# Patient Record
Sex: FEMALE | Race: WHITE
[De-identification: names, ages, dates, MRNs, and addresses within clinical notes are randomized per-mention and may not be internally consistent; named-entity substitution may affect disease eponyms.]

---

## 2017-11-14 ENCOUNTER — HOSPITAL ENCOUNTER (INPATIENT)
Dept: HOSPITAL 62 - ER | Age: 72
LOS: 1 days | Discharge: ANOTHER HOSPITAL | DRG: 189 | End: 2017-11-15
Attending: FAMILY MEDICINE | Admitting: FAMILY MEDICINE
Payer: MEDICARE

## 2017-11-14 DIAGNOSIS — J90: ICD-10-CM

## 2017-11-14 DIAGNOSIS — G50.0: ICD-10-CM

## 2017-11-14 DIAGNOSIS — J86.9: ICD-10-CM

## 2017-11-14 DIAGNOSIS — Z79.899: ICD-10-CM

## 2017-11-14 DIAGNOSIS — E11.9: ICD-10-CM

## 2017-11-14 DIAGNOSIS — Z88.8: ICD-10-CM

## 2017-11-14 DIAGNOSIS — Z87.891: ICD-10-CM

## 2017-11-14 DIAGNOSIS — E44.0: ICD-10-CM

## 2017-11-14 DIAGNOSIS — H40.9: ICD-10-CM

## 2017-11-14 DIAGNOSIS — Z79.84: ICD-10-CM

## 2017-11-14 DIAGNOSIS — J18.1: ICD-10-CM

## 2017-11-14 DIAGNOSIS — J44.1: ICD-10-CM

## 2017-11-14 DIAGNOSIS — D64.9: ICD-10-CM

## 2017-11-14 DIAGNOSIS — F11.20: ICD-10-CM

## 2017-11-14 DIAGNOSIS — J44.0: ICD-10-CM

## 2017-11-14 DIAGNOSIS — J96.01: Primary | ICD-10-CM

## 2017-11-14 LAB
ALBUMIN SERPL-MCNC: 2.9 G/DL (ref 3.5–5)
ALP SERPL-CCNC: 99 U/L (ref 38–126)
ALT SERPL-CCNC: 21 U/L (ref 9–52)
ANION GAP SERPL CALC-SCNC: 11 MMOL/L (ref 5–19)
AST SERPL-CCNC: 30 U/L (ref 14–36)
BASE EXCESS BLDV CALC-SCNC: 5.4 MMOL/L
BASOPHILS # BLD AUTO: 0 10^3/UL (ref 0–0.2)
BASOPHILS NFR BLD AUTO: 0.3 % (ref 0–2)
BILIRUB DIRECT SERPL-MCNC: 0.5 MG/DL (ref 0–0.4)
BILIRUB SERPL-MCNC: 0.6 MG/DL (ref 0.2–1.3)
BUN SERPL-MCNC: 17 MG/DL (ref 7–20)
CALCIUM: 8.8 MG/DL (ref 8.4–10.2)
CHLORIDE SERPL-SCNC: 96 MMOL/L (ref 98–107)
CK MB SERPL-MCNC: < 0.22 NG/ML (ref ?–4.55)
CK SERPL-CCNC: < 20 U/L (ref 30–135)
CO2 SERPL-SCNC: 31 MMOL/L (ref 22–30)
CREAT SERPL-MCNC: 0.48 MG/DL (ref 0.52–1.25)
EOSINOPHIL # BLD AUTO: 0 10^3/UL (ref 0–0.6)
EOSINOPHIL NFR BLD AUTO: 0 % (ref 0–6)
ERYTHROCYTE [DISTWIDTH] IN BLOOD BY AUTOMATED COUNT: 17.4 % (ref 11.5–14)
GLUCOSE SERPL-MCNC: 183 MG/DL (ref 75–110)
HCO3 BLDV-SCNC: 29.5 MMOL/L (ref 20–32)
HCT VFR BLD CALC: 30.2 % (ref 36–47)
HGB BLD-MCNC: 9.9 G/DL (ref 12–15.5)
HGB HCT DIFFERENCE: -0.5
LYMPHOCYTES # BLD AUTO: 0.8 10^3/UL (ref 0.5–4.7)
LYMPHOCYTES NFR BLD AUTO: 6.8 % (ref 13–45)
MCH RBC QN AUTO: 28.4 PG (ref 27–33.4)
MCHC RBC AUTO-ENTMCNC: 32.6 G/DL (ref 32–36)
MCV RBC AUTO: 87 FL (ref 80–97)
MONOCYTES # BLD AUTO: 1.2 10^3/UL (ref 0.1–1.4)
MONOCYTES NFR BLD AUTO: 9.4 % (ref 3–13)
NEUTROPHILS # BLD AUTO: 10.4 10^3/UL (ref 1.7–8.2)
NEUTS SEG NFR BLD AUTO: 83.5 % (ref 42–78)
PCO2 BLDV: 41.6 MMHG (ref 35–63)
PH BLDV: 7.47 [PH] (ref 7.3–7.42)
POTASSIUM SERPL-SCNC: 4.3 MMOL/L (ref 3.6–5)
PROT SERPL-MCNC: 6.7 G/DL (ref 6.3–8.2)
RBC # BLD AUTO: 3.48 10^6/UL (ref 3.72–5.28)
SODIUM SERPL-SCNC: 138 MMOL/L (ref 137–145)
TROPONIN I SERPL-MCNC: < 0.012 NG/ML
TROPONIN I SERPL-MCNC: < 0.012 NG/ML
WBC # BLD AUTO: 12.5 10^3/UL (ref 4–10.5)

## 2017-11-14 PROCEDURE — 94640 AIRWAY INHALATION TREATMENT: CPT

## 2017-11-14 PROCEDURE — 94799 UNLISTED PULMONARY SVC/PX: CPT

## 2017-11-14 PROCEDURE — 71010: CPT

## 2017-11-14 PROCEDURE — 80053 COMPREHEN METABOLIC PANEL: CPT

## 2017-11-14 PROCEDURE — 80048 BASIC METABOLIC PNL TOTAL CA: CPT

## 2017-11-14 PROCEDURE — 96375 TX/PRO/DX INJ NEW DRUG ADDON: CPT

## 2017-11-14 PROCEDURE — 82553 CREATINE MB FRACTION: CPT

## 2017-11-14 PROCEDURE — 82550 ASSAY OF CK (CPK): CPT

## 2017-11-14 PROCEDURE — 85610 PROTHROMBIN TIME: CPT

## 2017-11-14 PROCEDURE — 82728 ASSAY OF FERRITIN: CPT

## 2017-11-14 PROCEDURE — 83880 ASSAY OF NATRIURETIC PEPTIDE: CPT

## 2017-11-14 PROCEDURE — 96367 TX/PROPH/DG ADDL SEQ IV INF: CPT

## 2017-11-14 PROCEDURE — 71275 CT ANGIOGRAPHY CHEST: CPT

## 2017-11-14 PROCEDURE — 82607 VITAMIN B-12: CPT

## 2017-11-14 PROCEDURE — 93005 ELECTROCARDIOGRAM TRACING: CPT

## 2017-11-14 PROCEDURE — 36415 COLL VENOUS BLD VENIPUNCTURE: CPT

## 2017-11-14 PROCEDURE — 84466 ASSAY OF TRANSFERRIN: CPT

## 2017-11-14 PROCEDURE — 84484 ASSAY OF TROPONIN QUANT: CPT

## 2017-11-14 PROCEDURE — 82746 ASSAY OF FOLIC ACID SERUM: CPT

## 2017-11-14 PROCEDURE — 87040 BLOOD CULTURE FOR BACTERIA: CPT

## 2017-11-14 PROCEDURE — 83550 IRON BINDING TEST: CPT

## 2017-11-14 PROCEDURE — 99291 CRITICAL CARE FIRST HOUR: CPT

## 2017-11-14 PROCEDURE — 85730 THROMBOPLASTIN TIME PARTIAL: CPT

## 2017-11-14 PROCEDURE — 82962 GLUCOSE BLOOD TEST: CPT

## 2017-11-14 PROCEDURE — 85025 COMPLETE CBC W/AUTO DIFF WBC: CPT

## 2017-11-14 PROCEDURE — 84443 ASSAY THYROID STIM HORMONE: CPT

## 2017-11-14 PROCEDURE — 82803 BLOOD GASES ANY COMBINATION: CPT

## 2017-11-14 PROCEDURE — 93010 ELECTROCARDIOGRAM REPORT: CPT

## 2017-11-14 PROCEDURE — 83605 ASSAY OF LACTIC ACID: CPT

## 2017-11-14 PROCEDURE — 86480 TB TEST CELL IMMUN MEASURE: CPT

## 2017-11-14 PROCEDURE — 83540 ASSAY OF IRON: CPT

## 2017-11-14 PROCEDURE — 85045 AUTOMATED RETICULOCYTE COUNT: CPT

## 2017-11-14 PROCEDURE — 96361 HYDRATE IV INFUSION ADD-ON: CPT

## 2017-11-14 PROCEDURE — 83036 HEMOGLOBIN GLYCOSYLATED A1C: CPT

## 2017-11-14 PROCEDURE — 96365 THER/PROPH/DIAG IV INF INIT: CPT

## 2017-11-14 RX ADMIN — CEFEPIME HYDROCHLORIDE SCH ML: 2 INJECTION, SOLUTION INTRAVENOUS at 23:30

## 2017-11-14 RX ADMIN — TIMOLOL MALEATE SCH DROP: 5 SOLUTION/ DROPS OPHTHALMIC at 23:51

## 2017-11-14 RX ADMIN — METHYLPREDNISOLONE SODIUM SUCCINATE SCH MG: 40 INJECTION, POWDER, FOR SOLUTION INTRAMUSCULAR; INTRAVENOUS at 23:48

## 2017-11-14 RX ADMIN — ATORVASTATIN CALCIUM SCH MG: 10 TABLET, FILM COATED ORAL at 23:49

## 2017-11-14 RX ADMIN — SENNOSIDES, DOCUSATE SODIUM SCH EACH: 50; 8.6 TABLET, FILM COATED ORAL at 23:49

## 2017-11-14 RX ADMIN — GUAIFENESIN SCH MG: 600 TABLET, EXTENDED RELEASE ORAL at 23:50

## 2017-11-14 RX ADMIN — Medication SCH ML: at 23:51

## 2017-11-14 NOTE — PDOC H&P
History of Present Illness


Admission Date/PCP: 


  17 20:56





  FAUSTINA RAMSEY MD





History of Present Illness: 


KSENIA ESTRADA is a 72 year old female with past medical history of trigeminal 

neuralgia chronic, chronic opiate dependence, diabetes mellitus type 2, COPD, 

and glaucoma who presents to the emergency department with a cough of several 

weeks duration.  Patient reports that she was diagnosed with pneumonia 

approximately 1 month ago and received an antibiotic which she cannot recall 

the name of and completed.  She did request states that she did not feel any 

better after having this antibiotic.  She reports that her shortness of breath 

has gotten worse.  She does admit to chest pain on both her left and right 

side.  She reports that this is a sharp stabbing pain that is worse with deep 

inspiration.  She also reports orthopnea, dyspnea on exertion, and bilateral 

lower extremity edema.  She reports that she is currently being worked up for 

congestive heart failure and has been placed on a water pill.  She takes states 

that she takes this every other day.  She reports that she has not eaten for 

the last 2 days.  She does report she has had some loose stool over the last 

several days.  Patient is referred to the hospitalist service for sepsis, 

pneumonia.








Please note that patient's medications are listed but do not include her 

oxycodone 15 mg p.o. every 6H prn





Past Medical History


Pulmonary Medical History: Reports: Chronic Obstructive Pulmonary Disease (COPD)

, Pneumonia


EENT Medical History: Reports: Other - Glaucoma


Neurological Medical History: Reports: Other - Trigeminal neuralgia


Endocrine Medical History: Reports: Diabetes Mellitus Type 2


Psychiatric Medical History: 


   Denies: Depression





Past Surgical History


Past Surgical History: Reports:  Section, Other - Trigeminal neuralgia 

surgery





Social History


Smoking Status: Former Smoker


Cigarettes Packs Per Day: 0.5


Number of Years Smokin


Last Time Smoked: 2017


Hx Recreational Drug Use: No


Hx Prescription Drug Abuse: No





- Advance Directive


Resuscitation Status: Full Code


Surrogate healthcare decision maker:: 





Mary Jo Dimasrisa, daughter





Family History


Family History: COPD, CVA


Parental Family History Reviewed: Yes


Children Family History Reviewed: Yes


Sibling(s) Family History Reviewed.: Yes





Medication/Allergy


Home Medications: 








Atorvastatin Calcium [Lipitor 10 mg Tablet] 10 mg PO QHS 17 


Citalopram Hydrobromide [Citalopram HBr] 15 mg PO DAILY 17 


Metformin HCl [Metformin HCl ER] 500 mg PO DAILY 17 


Timolol Maleate [Timoptic 0.5% Oph Soln 5 ml] 1 drop BTH_EYE Q12 17 








Allergies/Adverse Reactions: 


 





carbamazepine [From Tegretol] Allergy (Verified 17 15:56)


 


morphine [Morphine] Allergy (Verified 17 15:56)


 











Review of Systems


Constitutional: PRESENT: weakness.  ABSENT: chills, fever(s), headache(s), 

weight gain, weight loss


Eyes: ABSENT: visual disturbances


Ears: ABSENT: hearing changes


Nose, Mouth, and Throat: PRESENT: headache(s) - Chronic due to trigeminal 

neuralgia


Cardiovascular: PRESENT: chest pain, dyspnea on exertion, edema, orthropnea.  

ABSENT: palpitations


Respiratory: PRESENT: cough, dyspnea, sputum.  ABSENT: hemoptysis


Gastrointestinal: PRESENT: constipation, diarrhea, nausea.  ABSENT: abdominal 

pain, hematemesis, hematochezia, melena, vomiting


Genitourinary: ABSENT: difficulty urinating, dysuria, hematuria


Musculoskeletal: ABSENT: joint swelling


Integumentary: ABSENT: rash, wounds


Neurological: ABSENT: abnormal gait, abnormal speech, confusion, dizziness, 

focal weakness, syncope


Psychiatric: ABSENT: anxiety, depression, homidical ideation, suicidal ideation


Endocrine: ABSENT: cold intolerance, heat intolerance, polydipsia, polyuria


Hematologic/Lymphatic: ABSENT: easy bleeding, easy bruising





Physical Exam


Vital Signs: 


 











Temp Pulse Resp BP Pulse Ox


 


 97.6 F   60   20   92/49 L  94 


 


 17 22:33  17 22:33  17 22:33  17 22:33  17 22:33








 Intake & Output











 11/13/17 11/14/17 11/15/17





 06:59 06:59 06:59


 


Weight   47 kg











General appearance: PRESENT: mild distress - Acutely ill-appearing, well-

developed, well-nourished


Head exam: PRESENT: atraumatic, normocephalic


Eye exam: PRESENT: conjunctiva pink, EOMI, PERRLA.  ABSENT: conjunctival 

injection, scleral icterus


Ear exam: PRESENT: normal external ear exam


Mouth exam: PRESENT: dry mucosa, tongue midline


Neck exam: ABSENT: JVD, lymphadenopathy, thyromegaly, tracheal deviation


Respiratory exam: PRESENT: crackles, decreased breath sounds - Right lower lobe

, egophony, prolonged expiratory phas, rhonchi - Bilateral, symmetrical, 

tachypnea, other - General poor air excursion.  ABSENT: accessory muscle use, 

rales, unlabored, wheezes


Cardiovascular exam: PRESENT: RRR, +S1, +S2.  ABSENT: diastolic murmur, rubs, 

systolic murmur


Pulses: PRESENT: normal dorsalis pedis pul


Vascular exam: PRESENT: normal capillary refill


GI/Abdominal exam: PRESENT: hypoactive bowel sounds, normal bowel sounds, soft.

  ABSENT: distended, guarding, mass, Araujo's sign, organolmegaly, rebound, 

rigid, tenderness


Rectal exam: PRESENT: deferred


Extremities exam: PRESENT: clubbing, full ROM, +2 edema - To mid shin.  ABSENT: 

calf tenderness


Neurological exam: PRESENT: alert, awake, oriented to person, oriented to place

, oriented to time, oriented to situation, CN II-XII grossly intact.  ABSENT: 

motor sensory deficit


Psychiatric exam: PRESENT: anxious, appropriate affect.  ABSENT: homicidal 

ideation, suicidal ideation


Skin exam: PRESENT: dry, intact, warm.  ABSENT: cyanosis, rash





Results


Laboratory Results: 


 











  17





  21:45


 


TSH  0.81








 











  17





  21:45 21:45


 


Creatine Kinase  < 20 L 


 


CK-MB (CK-2)   < 0.22


 


Troponin I   < 0.012








 











  17





  17:13 17:13 17:13


 


WBC  12.5 H  


 


Hgb  9.9 L  


 


Plt Count  614 H  


 


VBG pH   


 


Sodium   138.0 


 


Potassium   4.3 


 


Chloride   96 L 


 


Carbon Dioxide   31 H 


 


Anion Gap   11 


 


BUN   17 


 


Creatinine   0.48 L 


 


Glucose   183 H 


 


Lactic Acid   


 


AST   30 


 


ALT   21 


 


Alkaline Phosphatase   99 


 


Creatine Kinase   < 20 L 


 


Troponin I    < 0.012


 


NT-Pro-B Natriuret Pep    2930 H


 


Total Protein   6.7 


 


Albumin   2.9 L 














  17





  17:13 17:13


 


WBC  


 


Hgb  


 


Plt Count  


 


VBG pH   7.47 H


 


Sodium  


 


Potassium  


 


Chloride  


 


Carbon Dioxide  


 


Anion Gap  


 


BUN  


 


Creatinine  


 


Glucose  


 


Lactic Acid  1.2 


 


AST  


 


ALT  


 


Alkaline Phosphatase  


 


Creatine Kinase  


 


Troponin I  


 


NT-Pro-B Natriuret Pep  


 


Total Protein  


 


Albumin  








Impressions: 


 





Chest/Abdomen CTA  17 00:00


IMPRESSION:  Extensive pneumonia on the right with large right pleural 

effusion.  Possible cavitary component.


Peripheral opacity in the left lung.


No pulmonary emboli.


 








Chest X-Ray  17 16:50


IMPRESSION:  Right pleural effusion.  Right lower lobe pneumonia versus 

atelectasis.


 











Status: Imported from PACS





Assessment & Plan





- Diagnosis


(1) Pneumonia


Qualifiers: 


   Pneumonia type: due to unspecified organism   Laterality: right   Lung 

location: lower lobe of lung   Qualified Code(s): J18.1 - Lobar pneumonia, 

unspecified organism   


Is this a current diagnosis for this admission?: Yes   


Plan: 


Patient reports that she failed outpatient therapy having an unknown antibiotic 

approximately 1 month ago.  In light of patient's COPD and chronic opiate 

dependence, have concerned for possible aspiration as there is a possible 

cavitary lesion on her CT.  Patient has not traveled to endemic parts of the of 

the world for tuberculosis or has been incarcerated, but will obtain a 

QuantiFERON gold.  





Place patient on cefepime, gentamicin, and Levaquin.  Will also place patient 

on Solu-Medrol.  Consider vancomycin if MRSA becomes suspect.








(2) Acute hypoxemic respiratory failure


Is this a current diagnosis for this admission?: Yes   


Plan: 


Oxygen therapy to maintain saturation between 90 and 94








(3) Pleural effusion, right


Is this a current diagnosis for this admission?: Yes   


Plan: 


Have concerned that this is a multifactorial effusion given patient's 

presentation sounds quite a bit like congestive heart failure.  Obtain an echo, 

and a thoracocentesis.  There is concern for a malignant effusion as well as a 

empyema.  This will be very revealing for this.  Hold on any Lasix at this time 

due to patient's relative hypotension.








(4) COPD exacerbation


Is this a current diagnosis for this admission?: Yes   


Plan: 


Place patient on scheduled nebulized treatments as well as Solu-Medrol








(5) Trigeminal neuralgia


Is this a current diagnosis for this admission?: Yes   


Plan: 


Continue oxycodone if blood pressure and breathing permits.  Patient reports 

her allergy to Tegretol is a rash and to oral morphine is a stomach problem 

undefined.








(6) Chronic prescription opiate use


Is this a current diagnosis for this admission?: Yes   


Plan: 


Continue oxycodone








(7) Malnutrition


Qualifiers: 


   Malnutrition type: protein-calorie malnutrition   Protein-calorie 

malnutrition severity: moderate   Qualified Code(s): E44.0 - Moderate protein-

calorie malnutrition   


Is this a current diagnosis for this admission?: Yes   


Plan: 


Place patient on Glucerna 3 times daily.








(8) Failure of outpatient therapy


Is this a current diagnosis for this admission?: Yes   





(9) DM type 2 (diabetes mellitus, type 2)


Qualifiers: 


   Diabetes mellitus complication status: with unspecified complications   

Diabetes mellitus long term insulin use: without long term use   Qualified Code(

s): E11.8 - Type 2 diabetes mellitus with unspecified complications   


Is this a current diagnosis for this admission?: Yes   


Plan: 


Will hold patient's metformin and place on sliding scale insulin.  Check A1c.








(10) Anemia


Qualifiers: 


   Anemia type: unspecified type   Qualified Code(s): D64.9 - Anemia, 

unspecified   


Is this a current diagnosis for this admission?: Yes   


Plan: 


We will guaiac patient's stool.  Obtain anemia studies.








- Time


Time Spent: 50 to 70 Minutes


Medications reviewed and adjusted accordingly: Yes


Anticipated discharge: Home





- Inpatient Certification


Based on my medical assessment, after consideration of the patient's 

comorbidities, presenting symptoms, or acuity I expect that the services needed 

warrant INPATIENT care.: Yes


I certify that my determination is in accordance with my understanding of 

Medicare's requirements for reasonable and necessary INPATIENT services [42 CFR 

412.3e].: Yes


Medical Necessity: Failure to Improve With Outpatient Therapy, Need For IV 

Fluids, Need For Continuous Telemetry Monitoring, Need for Nebulizer Therapy 

and Monitoring of Response, Need for IV Antibiotics


Post Hospital Care: D/C Planner Documentation

## 2017-11-14 NOTE — ER DOCUMENT REPORT
ED Respiratory Problem





- General


Chief Complaint: Cough


Stated Complaint: NO APPETITE


Time Seen by Provider: 17 17:39


Notes: 


She is a 72-year-old female who presents with increasing productive cough and 

decreased appetite over the past few days.  She is also having mild shortness 

of breath. She does not wear oxygen at home. She denies chest pain, recent 

travel, fevers, nausea, vomiting, back pain, leg swelling, hemoptysis or sick 

contacts.


TRAVEL OUTSIDE OF THE [place] IN LAST 30 DAYS: No





- Related Data


Allergies/Adverse Reactions: 


 





carbamazepine [From Tegretol] Allergy (Verified 17 15:56)


 


morphine [Morphine] Allergy (Verified 17 15:56)


 








Home Medications: 


 Current Home Medications





Atorvastatin Calcium [Lipitor 10 mg Tablet] 10 mg PO QHS 17 [History]


Citalopram Hydrobromide [Citalopram HBr] 15 mg PO DAILY 17 [History]


Metformin HCl [Metformin HCl ER] 500 mg PO DAILY 17 [History]


Timolol Maleate [Timoptic 0.5% Oph Soln 5 ml] 1 drop BTH_EYE Q12 17 [

History]











Past Medical History





- General


Information source: Patient





- Social History


Smoking Status: Former Smoker


Chew tobacco use (# tins/day): No


Frequency of alcohol use: None


Drug Abuse: None


Family History: Reviewed & Not Pertinent


Patient has suicidal ideation: No


Patient has homicidal ideation: No


Endocrine Medical History: Reports: Hx Diabetes Mellitus Type 2


Renal/ Medical History: Denies: Hx Peritoneal Dialysis


Past Surgical History: Reports: Hx  Section, Hx Neurologic Surgery - 

facial nerve





- Immunizations


Hx Diphtheria, Pertussis, Tetanus Vaccination: Yes





Review of Systems





- Review of Systems


Notes: 


REVIEW OF SYSTEMS:


CONSTITUTIONAL: -fevers, -chills


EENT: -eye pain, -difficulty swallowing, -nasal congestion


CARDIOVASCULAR:-chest pain, -syncope.


RESPIRATORY: +cough, +SOB


GASTROINTESTINAL: -abdominal pain, -nausea, -vomiting, -diarrhea


GENITOURINARY: -dysuria, -hematuria


MUSCULOSKELETAL: -back pain, -neck pain


SKIN: -rash or skin lesions.


HEMATOLOGIC: -easy bruising or bleeding.


LYMPHATIC: -swollen, enlarged glands.


NEUROLOGICAL: -altered mental status or loss of consciousness, -headache, -

neurologic symptoms


PSYCHIATRIC: -anxiety, -depression.


ALL OTHER SYSTEMS REVIEWED AND NEGATIVE.





Physical Exam





- Vital signs


Vitals: 


 











Temp Pulse Resp BP Pulse Ox


 


 98 F   70   28 H  104/52 L  91 L


 


 17 15:59  17 15:59  17 15:59  17 15:59  17 15:59














- Notes


Notes: 


PHYSICAL EXAMINATION:





GENERAL: Mild respiratory distress.





HEAD: Atraumatic, normocephalic.





EYES: Pupils equal round and reactive to light, extraocular movements intact, 

sclera anicteric, conjunctiva are normal.





ENT: nares patent, oropharynx clear without exudates.  Moist mucous membranes.





NECK: Normal range of motion, supple without lymphadenopathy





LUNGS: Tachypnea. Crackles in RLL.





HEART: Regular rate and rhythm without murmurs





ABDOMEN: Soft, nontender, normoactive bowel sounds.  No guarding, no rebound.  

No masses appreciated.





EXTREMITIES: Normal range of motion, no pitting or edema.  No cyanosis.





NEUROLOGICAL: Cranial nerves grossly intact.  Normal speech, normal gait.  

Normal sensory and motor exams.





PSYCH: Normal mood, normal affect.





SKIN: Warm, Dry, normal turgor, no rashes or lesions noted.





Course





- Re-evaluation


Re-evalutation: 


Patient with right lower lobe pleural effusion and pneumonia.  With the 

leukocytosis and productive cough, will begin antibiotics for CAP.  She remains 

tachypneic at rest, which worsens during exertion.  Rest of blood work is all 

unremarkable.  She recently moved to [place] from [place] to live 

with her daughter and her primary care physician is Jasen Sullivan.





17 21:00 Spoke to Dr. Kathleen (Hospitalist) and she will admit patient as 

Inpatient to [facility] for further treatment and monitoring of her extensive 

pneumonia. CTA shows a possible cavitary lesion, but patient has no risk 

factors for tuberculosis and no recent travel or long term time.





- Vital Signs


Vital signs: 


 











Temp Pulse Resp BP Pulse Ox


 


 97.8 F   64   36 H  129/55 H  93 


 


 17 20:07  17 20:00  17 21:13  17 21:13  17 21:13














- Laboratory


Result Diagrams: 


 17 17:13





 17 17:13


Laboratory results interpreted by me: 


 











  17





  17:13 17:13 17:13


 


WBC  12.5 H  


 


RBC  3.48 L  


 


Hgb  9.9 L  


 


Hct  30.2 L  


 


RDW  17.4 H  


 


Plt Count  614 H  


 


Seg Neutrophils %  83.5 H  


 


Lymphocytes %  6.8 L  


 


Absolute Neutrophils  10.4 H  


 


VBG pH   


 


Chloride   96 L 


 


Carbon Dioxide   31 H 


 


Creatinine   0.48 L 


 


Glucose   183 H 


 


Direct Bilirubin   0.5 H 


 


Creatine Kinase   < 20 L 


 


NT-Pro-B Natriuret Pep    2930 H


 


Albumin   2.9 L 














  17





  17:13


 


WBC 


 


RBC 


 


Hgb 


 


Hct 


 


RDW 


 


Plt Count 


 


Seg Neutrophils % 


 


Lymphocytes % 


 


Absolute Neutrophils 


 


VBG pH  7.47 H


 


Chloride 


 


Carbon Dioxide 


 


Creatinine 


 


Glucose 


 


Direct Bilirubin 


 


Creatine Kinase 


 


NT-Pro-B Natriuret Pep 


 


Albumin 














- Diagnostic Test


Radiology reviewed: Image reviewed, Reports reviewed


Radiology results interpreted by me: 


CXR: Right pleural effusion. Right lower lobe pneumonia versus atelectasis.


CTA Chest: Extensive pneumonia on the right with large right pleural effusion. 

Possible cavitary component. Peripheral opacity in the left lung. No pulmonary 

emboli.





- EKG Interpretation by Me


EKG shows normal: Sinus rhythm, Axis, Intervals, QRS Complexes, ST-T Waves


Rate: Normal


When compared to previous EKG there are: No significant change





Critical Care Note





- Critical Care Note


Total time excluding time spent on procedures (mins): 35





Discharge





- Discharge


Clinical Impression: 


 Pleural effusion, right





Pneumonia


Qualifiers:


 Pneumonia type: due to unspecified organism Laterality: right Lung location: 

lower lobe of lung Qualified Code(s): J18.1 - Lobar pneumonia, unspecified 

organism





Condition: Stable


Disposition: ADMITTED AS INPATIENT


Admitting Provider: Hospitalist [facility]


Unit Admitted: [facility]

## 2017-11-14 NOTE — RADIOLOGY REPORT (SQ)
EXAM DESCRIPTION:  CTA CHEST



COMPLETED DATE/TIME:  11/14/2017 9:04 pm



REASON FOR STUDY:  SOB



COMPARISON:  None.



TECHNIQUE:  CT scan of the chest performed using helical scanning technique with dynamic intravenous 
contrast injection.  Images reviewed with lung, soft tissue and bone windows.  Reconstructed coronal 
and sagittal MPR images reviewed.

Additional 3 dimensional post-processing performed to develop Maximal Intensity Projection images (MI
P).  All images stored on PACS.

All CT scanners at this facility use dose modulation, iterative reconstruction, and/or weight based d
osing when appropriate to reduce radiation dose to as low as reasonably achievable (ALARA).

[facility]: Dose Right  [facility]: CareDose    [facility]: Dose Right    [facility]: Teradose 4D    [facility]: Smart Technologies



CONTRAST TYPE AND DOSE:  contrast/concentration: Isovue 370.00 mg/ml; Total Contrast Delivered: 60.0 
ml; Total Saline Delivered: 40.1 ml

Contrast bolus adequate for pulmonary arteries and aorta.



RENAL FUNCTION:  GFR > 60.



RADIATION DOSE:  Up-to-date CT equipment and radiation dose reduction techniques were employed. CTDIv
ol: 13.2 - 14.3 mGy. DLP: 496 mGy-cm. .



LIMITATIONS:  None.



FINDINGS:  LUNGS AND PLEURA: Large right pleural effusion with extensive opacity and volume loss in t
he right lung.  Possible early cavitation the right upper lobe.  Peripheral pleural-based opacity in 
the left lung.  Small left effusion.

AORTA AND GREAT VESSELS: No aneurysm.  Contrast bolus not optimized for the aorta.

HEART: No pericardial effusion. No significant coronary artery calcifications.

PULMONARY ARTERIES: No emboli visualized in the main pulmonary arteries or the segmental branches.

HILAR AND MEDIASTINAL STRUCTURES: No identified masses or abnormal nodes.

HARDWARE: None in the chest.

UPPER ABDOMEN: No significant findings.  Limited exam.

THYROID AND OTHER SOFT TISSUES: No masses.  No adenopathy.

BONES: No acute or significant finding.

3D MIPS: Confirm above findings.

OTHER: No other significant finding.



IMPRESSION:  Extensive pneumonia on the right with large right pleural effusion.  Possible cavitary c
omponent.

Peripheral opacity in the left lung.

No pulmonary emboli.



COMMENT:  Quality ID # [id number]: Final reports with documentation of one or more dose reduction techniques
 (e.g., Automated exposure control, adjustment of the mA and/or kV according to patient size, use of 
iterative reconstruction technique)



TECHNICAL DOCUMENTATION:  JOB ID:  [id number]

 2011 [employer]- All Rights Reserved

## 2017-11-14 NOTE — RADIOLOGY REPORT (SQ)
EXAM DESCRIPTION:  CHEST SINGLE VIEW



COMPLETED DATE/TIME:  11/14/2017 5:16 pm



REASON FOR STUDY:  cough



COMPARISON:  None.



EXAM PARAMETERS:  NUMBER OF VIEWS: One view.

TECHNIQUE: Single frontal radiographic view of the chest acquired.

RADIATION DOSE: NA

LIMITATIONS: None.



FINDINGS:  LUNGS AND PLEURA: There is a right pleural effusion and considerable opacification in the 
right lower hemithorax.

MEDIASTINUM AND HILAR STRUCTURES: No masses.  Contour normal.

HEART AND VASCULAR STRUCTURES: Heart normal in size.  Normal vasculature.

BONES: No acute findings.

HARDWARE: None in the chest.

OTHER: No other significant finding.



IMPRESSION:  Right pleural effusion.  Right lower lobe pneumonia versus atelectasis.



TECHNICAL DOCUMENTATION:  JOB ID:  [id number]

 2011 [employer]- All Rights Reserved

## 2017-11-15 VITALS — SYSTOLIC BLOOD PRESSURE: 100 MMHG | DIASTOLIC BLOOD PRESSURE: 60 MMHG

## 2017-11-15 LAB
ANION GAP SERPL CALC-SCNC: 12 MMOL/L (ref 5–19)
ANISOCYTOSIS BLD QL SMEAR: (no result)
APTT BLD: 43.6 SEC (ref 23.5–35.8)
BASOPHILS NFR BLD MANUAL: 0 % (ref 0–2)
BUN SERPL-MCNC: 14 MG/DL (ref 7–20)
CALCIUM: 8.3 MG/DL (ref 8.4–10.2)
CHLORIDE SERPL-SCNC: 101 MMOL/L (ref 98–107)
CK MB SERPL-MCNC: < 0.22 NG/ML (ref ?–4.55)
CO2 SERPL-SCNC: 25 MMOL/L (ref 22–30)
CREAT SERPL-MCNC: 0.42 MG/DL (ref 0.52–1.25)
EOSINOPHIL NFR BLD MANUAL: 0 % (ref 0–6)
ERYTHROCYTE [DISTWIDTH] IN BLOOD BY AUTOMATED COUNT: 17.1 % (ref 11.5–14)
FOLATE SERPL-MCNC: > 20 NG/ML (ref 2.76–?)
GLUCOSE SERPL-MCNC: 247 MG/DL (ref 75–110)
HCT VFR BLD CALC: 27.7 % (ref 36–47)
HGB BLD-MCNC: 9 G/DL (ref 12–15.5)
HGB HCT DIFFERENCE: -0.7
MCH RBC QN AUTO: 28 PG (ref 27–33.4)
MCHC RBC AUTO-ENTMCNC: 32.3 G/DL (ref 32–36)
MCV RBC AUTO: 87 FL (ref 80–97)
POTASSIUM SERPL-SCNC: 3.9 MMOL/L (ref 3.6–5)
PROTHROMBIN TIME: 16.2 SEC (ref 11.4–15.4)
RBC # BLD AUTO: 3.2 10^6/UL (ref 3.72–5.28)
SODIUM SERPL-SCNC: 138.2 MMOL/L (ref 137–145)
STAIN REACTIVITY CHECK: (no result)
TOTAL CELLS COUNTED BLD: 100
TOXIC GRANULES BLD QL SMEAR: SLIGHT
TROPONIN I SERPL-MCNC: < 0.012 NG/ML
VARIANT LYMPHS NFR BLD MANUAL: 6 % (ref 13–45)
VIT B12 SERPL-MCNC: 894 PG/ML (ref 239–931)
WBC # BLD AUTO: 9.5 10^3/UL (ref 4–10.5)

## 2017-11-15 RX ADMIN — GUAIFENESIN SCH MG: 600 TABLET, EXTENDED RELEASE ORAL at 08:45

## 2017-11-15 RX ADMIN — INSULIN LISPRO PRN UNIT: 100 INJECTION, SOLUTION INTRAVENOUS; SUBCUTANEOUS at 12:33

## 2017-11-15 RX ADMIN — DIVALPROEX SODIUM SCH MG: 250 TABLET, DELAYED RELEASE ORAL at 13:55

## 2017-11-15 RX ADMIN — GUAIFENESIN SCH MG: 600 TABLET, EXTENDED RELEASE ORAL at 21:28

## 2017-11-15 RX ADMIN — CEFEPIME HYDROCHLORIDE SCH ML: 2 INJECTION, SOLUTION INTRAVENOUS at 08:46

## 2017-11-15 RX ADMIN — INSULIN LISPRO PRN UNIT: 100 INJECTION, SOLUTION INTRAVENOUS; SUBCUTANEOUS at 21:25

## 2017-11-15 RX ADMIN — DOCUSATE SODIUM SCH MG: 100 CAPSULE, LIQUID FILLED ORAL at 17:01

## 2017-11-15 RX ADMIN — IPRATROPIUM BROMIDE AND ALBUTEROL SULFATE SCH ML: 2.5; .5 SOLUTION RESPIRATORY (INHALATION) at 08:02

## 2017-11-15 RX ADMIN — METHYLPREDNISOLONE SODIUM SUCCINATE SCH MG: 40 INJECTION, POWDER, FOR SOLUTION INTRAMUSCULAR; INTRAVENOUS at 05:06

## 2017-11-15 RX ADMIN — CEFEPIME HYDROCHLORIDE SCH ML: 2 INJECTION, SOLUTION INTRAVENOUS at 21:25

## 2017-11-15 RX ADMIN — DOCUSATE SODIUM SCH MG: 100 CAPSULE, LIQUID FILLED ORAL at 08:44

## 2017-11-15 RX ADMIN — DIVALPROEX SODIUM SCH MG: 250 TABLET, DELAYED RELEASE ORAL at 21:28

## 2017-11-15 RX ADMIN — METHYLPREDNISOLONE SODIUM SUCCINATE SCH MG: 125 INJECTION, POWDER, FOR SOLUTION INTRAMUSCULAR; INTRAVENOUS at 21:26

## 2017-11-15 RX ADMIN — ATORVASTATIN CALCIUM SCH MG: 10 TABLET, FILM COATED ORAL at 21:28

## 2017-11-15 RX ADMIN — SENNOSIDES, DOCUSATE SODIUM SCH EACH: 50; 8.6 TABLET, FILM COATED ORAL at 21:28

## 2017-11-15 RX ADMIN — METHYLPREDNISOLONE SODIUM SUCCINATE SCH MG: 125 INJECTION, POWDER, FOR SOLUTION INTRAMUSCULAR; INTRAVENOUS at 13:59

## 2017-11-15 RX ADMIN — IPRATROPIUM BROMIDE AND ALBUTEROL SULFATE SCH ML: 2.5; .5 SOLUTION RESPIRATORY (INHALATION) at 19:54

## 2017-11-15 RX ADMIN — TIMOLOL MALEATE SCH DROP: 5 SOLUTION/ DROPS OPHTHALMIC at 10:52

## 2017-11-15 RX ADMIN — Medication SCH ML: at 05:06

## 2017-11-15 RX ADMIN — IPRATROPIUM BROMIDE AND ALBUTEROL SULFATE SCH ML: 2.5; .5 SOLUTION RESPIRATORY (INHALATION) at 13:55

## 2017-11-15 RX ADMIN — INSULIN LISPRO PRN UNIT: 100 INJECTION, SOLUTION INTRAVENOUS; SUBCUTANEOUS at 17:01

## 2017-11-15 RX ADMIN — TIMOLOL MALEATE SCH DROP: 5 SOLUTION/ DROPS OPHTHALMIC at 21:27

## 2017-11-15 RX ADMIN — Medication SCH ML: at 21:30

## 2017-11-15 RX ADMIN — Medication SCH ML: at 13:59

## 2017-11-15 RX ADMIN — IPRATROPIUM BROMIDE AND ALBUTEROL SULFATE SCH ML: 2.5; .5 SOLUTION RESPIRATORY (INHALATION) at 01:57

## 2017-11-15 RX ADMIN — OXYCODONE HYDROCHLORIDE PRN MG: 5 TABLET ORAL at 13:55

## 2017-11-15 RX ADMIN — OXYCODONE HYDROCHLORIDE PRN MG: 5 TABLET ORAL at 18:04

## 2017-11-15 NOTE — TRANSFER SUMMARY E
Transfer Summary



NAME:     KSENIA ESTRADA

MRN: [id number]

: 1945                   AGE: 72Y

ADMITTED: 2017                TRANSFERRED: 11/15/2017



RECEIVING FACILITY:

[facility]



CODE STATUS:

DO NOT RESUSCITATE/DO NOT INTUBATE.



PRIMARY CARE PROVIDER:

Jasen Sullivan



TRANSFER DIAGNOSES:

Includes:

1.  Loculated effusion versus empyema of the right lung.

2.  Extensive right lung pneumonia.

3.  Diabetes mellitus type 2, which is well controlled.

4.  Non-oxygen-dependent COPD.

5.  Tobacco dependency.  The patient stopped smoking 10/01/2017.

6.  Diabetes mellitus type 2, non-insulin-dependent.

7.  Acute hypoxic respiratory failure secondary to #1.



CURRENT MEDICATIONS:

Include:

1.  Cefepime 2 g IV q. 12 hours.

2.  Levaquin 750 mg IV q. hour of sleep.

3.  Duonebs q. 6 hours p.r.n.

4.  Xopenex 1.25 mg nebs q. 2 hours p.r.n.

5.  Lipitor 10 mg p.o. q. hour of sleep.

6.  Haldol 650 mg p.o. q. 4 hours p.r.n.

7.  OxyIR 10 mg p.o. q. 6 hours p.r.n.

8.  Normal saline 83.3 mL an hour.

9.  Gentamicin IV 90 mg q. a.m.

10.  Timoptic 0.5 drops in both eyes q. 12.

11.  Colace 100 mg p.o. b.i.d.

12.  Solu-Medrol 80 mg IV q. 8 hours.

13.  Mucinex 600 mg p.o. q. 12 hours.



DIET:

Currently cardiac.  Patient will be made n.p.o. for transport.



ACTIVITY:

Bedrest.



DIAGNOSTICS:

Lab values are as follows:  Hematology obtained on 11/15/2017:  WBCs are

9.5, hemoglobin is 9.0, hematocrit is 27.7, platelet count is 526,000.



Coagulation obtained on 2017:  PT is 16.2, INR is 1.22.



Venous blood gases obtained on 2017:  PH is 7.47, PCO2 is 41.6,

bicarb is 29.5.



Chemistry obtained on 2017:  Sodium is 138, potassium 3.9, chloride

is 101, carbon dioxide 25, BUN 14, creatinine is 0.42, glucose is 247. 

A1C is 6.8.  Lactic acid is 1.2.  Calcium is 8.3, iron is 10.1, TIBC is

172.  Transferrin is pending.  Ferritin is 342.  BNP is 1730, B12 is 894. 

Folate is greater than 20.  Bilirubin is 0.6.  AST 30, ALT is 21, Alk phos

99, CK 20, CK-MB is 0.22.  Troponin is 0.012.  Serologies obtained on

11/15/2017:  TB Quantiferon is pending.



Microbiology:  Blood cultures obtained on 2017 is pending.



CTA of the chest and abdomen obtained on 2017 reveals extensive

pneumonia on the right with a large right pleural effusion, possible

cavitary component, peripheral opacity in the left lung, but no pulmonary

emboli.



EKG obtained on 2017 reveals sinus rhythm with left atrial

abnormality.



HISTORY OF PRESENT ILLNESS:

The patient is a 72-year-old  female with a past medical history

of diet-controlled diabetes as well as recent trigeminal neuralgia and

non-oxygen-dependent COPD.  The patient presented to the emergency

department with a chief complaint of shortness of breath, cough, and

sputum production.  The patient stated that she was diagnosed with

pneumonia outpatient about a month ago and had been receiving an

antibiotic, which she felt was Levaquin, without any improvement of

symptoms.  The patient stated that her shortness of breath had progressed

to the point that she was unable to fully complete sentences.  The patient

admitted to chest pain on both the right and left side of her chest, very

sharp, stabbing pain with deep inspiration.  The patient reported

orthopnea, dyspnea on exertion, and a presence of bilateral lower

extremity edema, which was new for her.  However, the patient has had

about 40 pound weight loss in spite of this.  The patient was noted to

have a white count of 12.5, a hemoglobin of 9.9, platelets of 614,000. 

The patient's chemistries were pretty unremarkable, and the patient did

have a BNP of 2900.  The patient was treated in the emergency department

with nebs as well as gentamicin and Levaquin, and the patient was referred

to the hospitalist for admission and management.  The patient also

received a dose of Lasix.



HOSPITAL COURSE:

The patient was admitted to [facility].  The patient was on scheduled and p.r.n.

breathing treatments and the patient eventually was able to transition to

a nasal cannula.  The patient was currently oxygenating about 95% on 2 L

and appears comfortable.  The patient's tachypnea overall has improved. 

The patient is now able to complete sentences on examination.



The patient was scheduled for a thoracentesis by Interventional Radiology

who called and discussed the case with myself and felt that the patient

would not benefit from procedure here and recommended a VATS procedure. 

Given that it appeared to be a loculated impending empyema, called and

discussed the case with Dr. Hoover with Thoracic Surgery who graciously

agreed to see the patient on consult, then contacted the Medicine Service,

Dr. Cole, who has graciously agreed to accept the patient in transfer,

and did discuss this plan with the patient.



PHYSICAL EXAMINATION:

GENERAL:  On examination, the patient is a frail, chronically

ill-appearing 72-year-old  female who is awake, alert.  She is

oriented to person, place, time, and situation.  She is verbal,

conversational, does not appear to be distressed at the moment.



VITAL SIGNS:  Temperature is 97.8, pulse 61, respirations 22, blood

pressure 115/58, oxygen saturation 100% on 2 L nasal cannula.



SKIN:  Warm and dry.  No rash.  Not diaphoretic.



HEENT:  Pupils equal, round, and reactive to light and accommodation. 

Conjunctivae pink.  No JVP.



CARDIOVASCULAR SYSTEM:  Heart is regular.  There is no murmur or rub.



CHEST:  Diminished.  The patient does have rhonchorous breath sounds all

throughout the right lung field.



ABDOMEN:  Soft, nontender, nondistended.



BACK:  No CVA tenderness or sacral edema.



EXTREMITIES:  The patient does have 1+ bilateral lower extremity pitting

edema, which is relatively new for the patient within the past month.



PSYCHIATRIC:  Appropriate affect, pleasant mood.



DISCHARGE PLANNING:

The patient will be received to the services of Dr. Cole, the

hospitalist at [facility] with Thoracic Surgery consultation.



Time spent on this transfer including assessment, plan, physical

examination, patient education, review of previous current medical

records, and resource alignment is 90 minutes.





DICTATING PHYSICIAN:  KIESHA RAYMUNDO NP





[id number]                  DT: 11/15/2017      1224

PHY#: [id number]            DD: 11/15/2017      1213

ID:   [id number]           JOB#: [id number]       ACCT: [id number]



cc:KIESHA RAYMUNDO NP

>







SRINIVASAN

## 2017-11-15 NOTE — EKG REPORT
SEVERITY:- ABNORMAL ECG -

SINUS RHYTHM

PROBABLE LEFT ATRIAL ABNORMALITY

LEFT ANTERIOR FASCICULAR BLOCK

LOW VOLTAGE IN FRONTAL LEADS

NONSPECIFIC T ABNORMALITIES, INFERIOR LEADS

BORDERLINE PROLONGED QT INTERVAL

:

Confirmed by: Juli Diaz 15-Nov-2017 09:27:50

## 2017-11-16 NOTE — PHYSICIAN ADVISORY NOTE
Physician Advisor ProgressNote


.: 


Pursuant to the  plan for [facility], I have reviewed the medical record 

for this patient.  





Physician Advisor Statement: 





Please consider documenting, if you agree:


1.  "ACute Hypoxemic Respiratory FAilure, evidenced by O2 sat only 95% on 2L O2

, tachypnea, resp distress, and unable to speak in complete sentences due to SOB

"
2. "underweight with protein-calorie malnutrition [state mild, mod, or severe

] with BMI  17.8, ..." 


 [if possible, give specifics on intake, wt loss, loss of SQ fat & muscle mass, 

diminished hand  strength,    & clinical importance such as (A) 

nutritional assessment ordered, (B) modified diet or supplements ordered,  (C) 

additional labs ordered, (D) prolonged wound healing time, (E) delayed infxn 

clearance]   








CK

## 2017-11-17 LAB
QUANTIFERON TB ANTIGEN VALUE: 0.09 IU/ML
QUANTIFERON TB NIL VALUE: 0.06 IU/ML

## 2017-12-21 ENCOUNTER — HOSPITAL ENCOUNTER (OUTPATIENT)
Dept: HOSPITAL 62 - RT | Age: 72
End: 2017-12-21
Attending: SURGERY
Payer: MEDICARE

## 2017-12-21 DIAGNOSIS — Z72.0: ICD-10-CM

## 2017-12-21 DIAGNOSIS — R06.02: Primary | ICD-10-CM

## 2017-12-21 PROCEDURE — 94060 EVALUATION OF WHEEZING: CPT

## 2017-12-21 PROCEDURE — 94729 DIFFUSING CAPACITY: CPT

## 2017-12-21 PROCEDURE — 94727 GAS DIL/WSHOT DETER LNG VOL: CPT

## 2017-12-22 NOTE — PULMONARY FUNCTION TEST
Pulmonary Function Test


Date of Procedure:: 12/22/17


INDICATION:: Dyspnea


Referring Provider: Dr. Clemente Isabel


Technician: Annalee Lynch RRT, RCP





- Report


Spirometry: 





FVC 2.62 L 93%    postbronchodilator therapy 2.71 L 96%





FEV1 1.75 L 81%    postbronchodilator therapy 1.78 L 82%





FEV1/FVC % 67    postbronchodilator therapy 65    predicted 82





FEF 25-75% 1.06 L 53%    postbronchodilator therapy 1.15 L 58%





Lung Volume: 





Total lung capacity 4.37 L    91%





Vital capacity 2.87 L    93%





IC 1.19





FRC N2 3.18       103%





ERV       0.82





RV 1.75      90%





RV/TLC %       40    predicted 41





Diffusion Capactity: 





DLCO 10.8       70%





DLCO/VA 3.20    88%


Impression: 





This study demonstrates moderate obstructive ventilatory defect with 

insignificant response to bronchodilator therapy.  This does not preclude a 

clinical trial of bronchodilator therapy.  There is no evidence for restrictive 

ventilatory defect hyperinflation or air trapping.  There is a mild decrease in 

diffusion capacity.

## 2018-01-02 ENCOUNTER — HOSPITAL ENCOUNTER (OUTPATIENT)
Dept: HOSPITAL 62 - RAD | Age: 73
End: 2018-01-02
Attending: SURGERY
Payer: MEDICARE

## 2018-01-02 DIAGNOSIS — R91.8: Primary | ICD-10-CM

## 2018-01-02 PROCEDURE — 78814 PET IMAGE W/CT LMTD: CPT

## 2018-01-02 PROCEDURE — A9552 F18 FDG: HCPCS

## 2018-01-03 NOTE — RADIOLOGY REPORT (SQ)
EXAM DESCRIPTION:  PET CT LIMITED



COMPLETED DATE/TIME:  1/2/2018 7:50 pm



REASON FOR STUDY:  ABNORMAL FINDINGS OF LUNG FIELD R91.8  OTHER NONSPECIFIC ABNORMAL FINDING OF LUNG 
FIELD



COMPARISON:  CT chest 11/14/2017



RADIONUCLIDE AND DOSE:  10.4 mCi F18 FDG

The route of agent administration: Intravenous



FASTING BLOOD SUGAR:  99 mg/dl



CONTRAST TYPE AND DOSE:  No CT contrast given.



TECHNIQUE:  Blood glucose level was verified.  Above dose of FDG was injected intravenously.  2-D seg
mented attenuation correction images were obtained from the base of the skull to the midthighs.  Nonc
ontrast CT images were obtained for attenuation correction and fusion with emission images.  CT image
s were performed without oral or intravenous contrast and are not sensitive for parenchymal lesions. 
 A series of overlapping emission PET images were obtained.  Images reviewed and manipulated at [facility] work station by the radiologist.  Images stored on PACS.



LIMITATIONS:  None.



FINDINGS:  HEAD AND NECK: No areas of abnormal metabolic activity in the soft tissues of the head and
 neck.

CHEST: Prior CT chest was reviewed.  Since the prior exam on 11/14/2017, the bilateral pleural effusi
ons and mediastinal adenopathy have resolved.  On today's study, a 1.3 x 1 cm precarinal lymph node i
s present with SUV 1.2 on axial image 78 (was 1.7 x 1.2 cm on CT 11/14/2017).

In the right middle lobe, cavitary infiltrate seen 11/14/2017 has significantly improved.  There is p
ersistent right middle lobe volume loss with air bronchograms on axial image , with patchy meta
bolic activity ranging up to 2.5 SUV.

In the right lower lobe, along the lateral inferior edge of the major fissure there is a focal area o
f consolidation above the mm in greatest diameter on axial image 94.  This has SUV of 2.5.  Overall, 
the amount of consolidation in this area has significantly improved compared to 11/14/2017.

In the lingula, a 1.5 cm soft tissue density is present along the anterior inferior edge of the major
 fissure, non metabolic.  This is smaller than on 11/14/2017 where it measured about 3.5 by 3 cm in s
ize.

ABDOMEN AND PELVIS: No areas of abnormal metabolic activity in the abdomen or pelvis.  Expected physi
ologic activity is present in the genitourinary system and bowel.

PROXIMAL LOWER EXTREMITIES: No areas of abnormal metabolic activity in the soft tissues of the lower 
extremities.

BONES: No abnormal metabolic activity in the visualized skeleton.

ADDITIONAL CT FINDINGS: Old right occipital craniotomy.  Non metabolic 2 cm left adrenal nodule axial
 image 123

OTHER: Liver background SUV 1.8.  Blood pool background activity 1.3 SUV



IMPRESSION:  Significant improvement in appearance of the chest compared to 11/14/2017.  There is sti
ll some volume loss and consolidation right middle lobe and right lower lobe with minimal metabolic a
ctivity 2.5 SUV.



TECHNICAL DOCUMENTATION:  JOB ID:  [id number]

 2011 [employer]- All Rights Reserved

## 2018-04-19 ENCOUNTER — HOSPITAL ENCOUNTER (OUTPATIENT)
Dept: HOSPITAL 62 - WI | Age: 73
End: 2018-04-19
Payer: MEDICARE

## 2018-04-19 DIAGNOSIS — Z12.31: Primary | ICD-10-CM

## 2018-04-19 PROCEDURE — 77063 BREAST TOMOSYNTHESIS BI: CPT

## 2018-04-19 PROCEDURE — 77067 SCR MAMMO BI INCL CAD: CPT

## 2018-04-19 NOTE — WOMENS IMAGING REPORT
EXAM DESCRIPTION:  3D SCREENING MAMMO BILAT



COMPLETED DATE/TIME:  4/19/2018 8:48 am



REASON FOR STUDY:  SCREENING MAMMO Z12.31  ENCNTR SCREEN MAMMOGRAM FOR MALIGNANT NEOPLASM OF JUSTINE



COMPARISON:  None available.



TECHNIQUE:  Standard craniocaudal and mediolateral oblique views of each breast recorded using digita
l acquisition and breast tomosynthesis.



LIMITATIONS:  None.



FINDINGS:  No masses, calcifications or architectural distortion. No areas of suspicion.

Read with the assistance of CAD.

.[facility] - R2 Cenova Version 1.3

.[facility] - R2 Cenova Version 1.3

.[facility] - R2 Cenova Version 2.4

.[facility] - R2 Cenova Version 2.4

.[facility] - R2  Version 9.2



IMPRESSION:  NORMAL MAMMOGRAM.  BIRADS 1.



BREAST DENSITY:  b. There are scattered areas of fibroglandular density.



BIRAD:  1 NEGATIVE



RECOMMENDATION:  ROUTINE SCREENING



COMMENT:  The patient has been notified of the results by letter per SA requirements. Additional no
tification policies are in place for contacting patient with suspicious or incomplete findings.

Quality ID #225: The American College of Radiology recommends an annual screening mammogram for women
 aged 40 years or over. This facility utilizes a reminder system to ensure that all patients receive 
reminder letters, and/or direct phone calls for appointments. This includes reminders for routine scr
eening mammograms, diagnostic mammograms, or other Breast Imaging Interventions when appropriate.  Th
is patient will be placed in the appropriate reminder system.

The American College of Radiology (ACR) has developed recommendations for screening MRI of the breast
s in certain patient populations, to be used in conjunction with mammography.  Breast MRI surveillanc
e may be appropriate for women with more than 20% lifetime risk of developing breast cancer  as deter
mined by genetic testing, significant family history of the disease, or history of mantle radiation f
or Hodgkins Disease.  ACR Practice Guidelines 2008.

[employer]



PQRS 6045F: Fluoroscopic imaging is not utilized for breast tomosynthesis.



TECHNICAL DOCUMENTATION:  FINDING NUMBER: (1)

ASSESSMENT:  (1)

JOB ID:  [id number]

 2011 [employer]- All Rights Reserved



Reading location - IP/workstation name: [facility]